# Patient Record
Sex: MALE | URBAN - METROPOLITAN AREA
[De-identification: names, ages, dates, MRNs, and addresses within clinical notes are randomized per-mention and may not be internally consistent; named-entity substitution may affect disease eponyms.]

---

## 2017-01-09 ENCOUNTER — OFFICE VISIT (OUTPATIENT)
Dept: INTERNAL MEDICINE CLINIC | Age: 37
End: 2017-01-09

## 2017-01-09 VITALS
SYSTOLIC BLOOD PRESSURE: 130 MMHG | HEART RATE: 90 BPM | HEIGHT: 73 IN | OXYGEN SATURATION: 97 % | WEIGHT: 212.4 LBS | RESPIRATION RATE: 16 BRPM | TEMPERATURE: 97.8 F | DIASTOLIC BLOOD PRESSURE: 92 MMHG | BODY MASS INDEX: 28.15 KG/M2

## 2017-01-09 DIAGNOSIS — N50.819 TESTICULAR PAIN: Primary | ICD-10-CM

## 2017-01-09 RX ORDER — ACETAMINOPHEN 325 MG/1
650 TABLET ORAL
COMMUNITY

## 2017-01-09 RX ORDER — TRAMADOL HYDROCHLORIDE 50 MG/1
TABLET ORAL
Refills: 0 | COMMUNITY
Start: 2016-12-26 | End: 2017-01-09

## 2017-01-09 NOTE — MR AVS SNAPSHOT
Visit Information Date & Time Provider Department Dept. Phone Encounter #  
 1/9/2017  1:15 PM Chris Cage MD [employer] [phone] [id number] Follow-up Instructions Return if symptoms worsen or fail to improve. Upcoming Health Maintenance Date Due DTaP/Tdap/Td series (1 - Tdap) 9/29/2002 Allergies as of 1/9/2017  Review Complete On: 1/9/2017 By: Chris Cage MD  
 No Known Allergies Current Immunizations  Never Reviewed No immunizations on file. Not reviewed this visit You Were Diagnosed With   
  
 Codes Comments Testicular pain    -  Primary ICD-10-CM: F35.592 ICD-9-CM: 744. 9 Vitals BP Pulse Temp Resp Height(growth percentile) Weight(growth percentile) (!) 130/92 (BP 1 Location: Left arm, BP Patient Position: Sitting) 90 97.8 °F (36.6 °C) (Oral) 16 6' 1\" (1.854 m) 212 lb 6.4 oz (96.3 kg) SpO2 BMI Smoking Status 97% 28.02 kg/m2 Never Smoker Vitals History BMI and BSA Data Body Mass Index Body Surface Area 28.02 kg/m 2 2.23 m 2 Preferred Pharmacy Pharmacy Name Phone [address], [address] Your Updated Medication List  
  
   
This list is accurate as of: 1/9/17  1:51 PM.  Always use your most recent med list.  
  
  
  
  
 acetaminophen 325 mg tablet Commonly known as:  TYLENOL Take 650 mg by mouth every four (4) hours as needed for Pain. MOTRIN PO Take 800 mg by mouth. Follow-up Instructions Return if symptoms worsen or fail to improve. Patient Instructions 1) follow-up as needed or sooner if worsening symptoms. Introducing [facility]! Cristina Card introduces [employer] patient portal. Now you can access parts of your medical record, email your doctor's office, and request medication refills online.    
 
1. In your internet browser, go to [email or web address] 2. Click on the First Time User? Click Here link in the Sign In box. You will see the New Member Sign Up page. 3. Enter your [employer] Access Code exactly as it appears below. You will not need to use this code after youve completed the sign-up process. If you do not sign up before the expiration date, you must request a new code. · [employer] Access Code: [email or web address] Expires: 4/9/2017  1:51 PM 
 
4. Enter the last four digits of your Social Security Number (xxxx) and Date of Birth (mm/dd/yyyy) as indicated and click Submit. You will be taken to the next sign-up page. 5. Create a [employer]Ft ID. This will be your [employer] login ID and cannot be changed, so think of one that is secure and easy to remember. 6. Create a [employer] password. You can change your password at any time. 7. Enter your Password Reset Question and Answer. This can be used at a later time if you forget your password. 8. Enter your e-mail address. You will receive e-mail notification when new information is available in [address] 9. Click Sign Up. You can now view and download portions of your medical record. 10. Click the Download Summary menu link to download a portable copy of your medical information. If you have questions, please visit the Frequently Asked Questions section of the [employer] website. Remember, [employer] is NOT to be used for urgent needs. For medical emergencies, dial 911. Now available from your iPhone and Android! Please provide this summary of care documentation to your next provider. If you have any questions after today's visit, please call [phone].

## 2017-01-09 NOTE — PROGRESS NOTES
ROOM # 1    Jessica Hall presents today for   Chief Complaint   Patient presents with    New Patient     establish care    Groin Pain     testicular pain, left radiating to right. swelling, redness    Mass     swollen lymph kate groin area    Nausea     from pain       Jessica Hall preferred language for health care discussion is english/other. Is someone accompanying this pt? Yes, wife    Is the patient using any DME equipment during [address]? no    Depression Screening:  PHQ 2 / 9, over the last two weeks 1/9/2017 1/9/2017   Little interest or pleasure in doing things Several days Not at all   Feeling down, depressed or hopeless Several days Not at all   Total Score PHQ 2 2 0       Learning Assessment:  No flowsheet data found. Abuse Screening:  No flowsheet data found. Fall Risk  No flowsheet data found. Health Maintenance reviewed and discussed per provider. Yes    Jessica Hall is due for [employer]. Please order/place referral if appropriate. Advance Directive:  1. Do you have an advance directive in place? Patient Reply: no    2. If not, would you like material regarding how to put one in place? Patient Reply: no    Coordination of Care:  1. Have you been to the ER, urgent care clinic since your last visit? Hospitalized since your last visit? [facility] for testicular pain    2. Have you seen or consulted any other health care providers outside of the [address] [place] [address] since your last visit? Include any pap smears or colon screening.  no

## 2017-01-09 NOTE — PROGRESS NOTES
Chief Complaint   Patient presents with    [facility]    Groin Pain     testicular pain, left radiating to right. swelling, redness    Mass     swollen lymph nodes kate groin area    Nausea     from pain         HPI:     Rachael Villalba is a 29 y.o.  male with history of testicular pain here for the above complaint. He is part of the 's choice program and then sent him here. He has testicular pain and this has been going on since November. He has an  appt. With urologist at [place] Dr. Leann Leigh 1/19/17. He needs form filled for disability for work. He has been out of work since 12/5/16 until 1/19/17. He has bilateral testicular pain and worse on the left than right. He can feel lymph nodes on both inguinal area. No fevers, chills, night sweats, penile discharge, chest pain, shortness of breath, headaches or dizziness, dysuria, hematuria, increase urgency/frequency, excessive bruising or bleeding. He says when he urinates or have a bowel movement, there is sharp pain in the testicular area and more so with bowel movement. He has bilateral testicular swelling, lower abdominal pain, and nausea, but no vomiting. He has vomited in the past. He went to [facility] on 12/26/16 and had testicular ultrasound which showed: Impression:     No evidence of torsion, intratesticular mass or epididymoorchitis. Small bilateral hydroceles.  Incidental tiny right epididymal head cyst.  Slight varicocele is possible on the right. Urine and gc/chlamydia were all normal.  He was also given IM rocephin 250mg and azithromycin 1000mg x 1 dose. He also went to [place] in November.          Past Medical History   Diagnosis Date    G6PD deficiency ([facility] [facility]     Sickle cell trait ([facility]     Testicular pain        Past Surgical History   Procedure Laterality Date    Hx abdominal laparoscopy  2008 approx           MEDICATION ALLERGIES/INTOLERANCES:   No Known Allergies          CURRENT MEDICATIONS:    Current Outpatient Prescriptions   Medication Sig    IBUPROFEN (MOTRIN PO) Take 800 mg by mouth.  acetaminophen (TYLENOL) 325 mg tablet Take 650 mg by mouth every four (4) hours as needed for Pain. No current facility-administered medications for this visit. Health Maintenance   Topic Date Due    DTaP/Tdap/Td series (1 - Tdap) 09/29/2002    INFLUENZA AGE 9 TO ADULT  Addressed         FAMILY HISTORY:   Family History   Problem Relation Age of Onset    Heart Attack Maternal Grandfather     Hypertension Mother     Cancer Mother      lung       SOCIAL HISTORY:   He  reports that he has never smoked. He does not have any smokeless tobacco history on file. He  reports that he drinks alcohol. OBJECTIVE:  PHYSICAL EXAM: Vitals:   Vitals:    01/09/17 1315 01/09/17 1332 01/09/17 1348   BP: (!) 149/95 (!) 137/101 (!) 130/92   Pulse: 90     Resp: 16     Temp: 97.8 °F (36.6 °C)     TempSrc: Oral     SpO2: 97%     Weight: 212 lb 6.4 oz (96.3 kg)     Height: 6' 1\" (1.854 m)       Exam:   Generally: Pleasant male in no acute distress    Cardiac exam: regular, rate, and rhythm. No murmurs, gallops, or rubs. Normal S1 and S2.    Pulmonary exam: Clear to ausculation bilaterally    Abdominal exam: Positive bowel sounds in all four quadrants. Soft. Nondistended. Nontender. No hepatosplenomegaly. Extremities: 2+ dorsalis pedis bilaterally. No pedal edema bilaterally. He did not want me to do testicular exam or check the groin area. He wanted to wait till he saw the urologist.        LABS/RADIOLOGICAL TESTS:   none          ASSESSMENT/PLAN:      ICD-10-CM ICD-9-CM    1. Testicular pain N50.819 608.9 Follow-up with urology. He is trying to get the disability paperwork faxed to us. 2. Patient verbalized understanding and agreement with the plan. 3. Patient was given an after visit summary. 4.   Follow-up Disposition:  Return if symptoms worsen or fail to improve.  or sooner if worsening symptoms.       Taurus Kaplan MD

## 2017-01-11 ENCOUNTER — TELEPHONE (OUTPATIENT)
Dept: INTERNAL MEDICINE CLINIC | Age: 37
End: 2017-01-11

## 2017-01-11 NOTE — TELEPHONE ENCOUNTER
Patient called looking for paperwork. He said it have already been two days. I advised him i will send a message and he can also resend paperwork.

## 2017-01-12 NOTE — TELEPHONE ENCOUNTER
Pt contacted at home number. 2 pt identifiers confirmed. Pt states his employer [employer]) faxed paperwork for pt because he is requesting short term disability. Pt informed nurse will check with MD regarding paperwork. Pt verbalized understanding. Dr Peng Chi please advise.

## 2017-01-13 NOTE — TELEPHONE ENCOUNTER
Contacted pt at [facility] number. Two patient Identifiers confirmed. Advised pt per Dr Dinah Reed notes. Pt verbalized understanding.